# Patient Record
Sex: MALE | Race: WHITE | Employment: FULL TIME | URBAN - METROPOLITAN AREA
[De-identification: names, ages, dates, MRNs, and addresses within clinical notes are randomized per-mention and may not be internally consistent; named-entity substitution may affect disease eponyms.]

---

## 2018-04-23 ENCOUNTER — HOSPITAL ENCOUNTER (EMERGENCY)
Age: 28
Discharge: HOME | End: 2018-04-23
Attending: EMERGENCY MEDICINE
Payer: COMMERCIAL

## 2018-04-23 ENCOUNTER — APPOINTMENT (OUTPATIENT)
Dept: GENERAL RADIOLOGY | Age: 28
End: 2018-04-23
Payer: COMMERCIAL

## 2018-04-23 VITALS
HEIGHT: 69 IN | HEART RATE: 77 BPM | DIASTOLIC BLOOD PRESSURE: 60 MMHG | SYSTOLIC BLOOD PRESSURE: 128 MMHG | WEIGHT: 175 LBS | BODY MASS INDEX: 25.92 KG/M2 | RESPIRATION RATE: 20 BRPM | OXYGEN SATURATION: 98 % | TEMPERATURE: 98.2 F

## 2018-04-23 DIAGNOSIS — S60.111A CONTUSION OF RIGHT THUMB WITH DAMAGE TO NAIL, INITIAL ENCOUNTER: Primary | ICD-10-CM

## 2018-04-23 DIAGNOSIS — S62.639A CLOSED AVULSION FRACTURE OF DISTAL PHALANX OF FINGER, INITIAL ENCOUNTER: ICD-10-CM

## 2018-04-23 PROCEDURE — 6370000000 HC RX 637 (ALT 250 FOR IP): Performed by: EMERGENCY MEDICINE

## 2018-04-23 PROCEDURE — 29130 APPL FINGER SPLINT STATIC: CPT

## 2018-04-23 PROCEDURE — 99283 EMERGENCY DEPT VISIT LOW MDM: CPT

## 2018-04-23 PROCEDURE — 73140 X-RAY EXAM OF FINGER(S): CPT

## 2018-04-23 RX ORDER — DEXTROAMPHETAMINE SACCHARATE, AMPHETAMINE ASPARTATE MONOHYDRATE, DEXTROAMPHETAMINE SULFATE AND AMPHETAMINE SULFATE 1.25; 1.25; 1.25; 1.25 MG/1; MG/1; MG/1; MG/1
5 CAPSULE, EXTENDED RELEASE ORAL EVERY MORNING
COMMUNITY

## 2018-04-23 RX ORDER — GINSENG 100 MG
CAPSULE ORAL 3 TIMES DAILY
Status: DISCONTINUED | OUTPATIENT
Start: 2018-04-23 | End: 2018-04-23 | Stop reason: HOSPADM

## 2018-04-23 RX ORDER — IBUPROFEN 600 MG/1
600 TABLET ORAL EVERY 6 HOURS PRN
Qty: 30 TABLET | Refills: 0 | Status: SHIPPED | OUTPATIENT
Start: 2018-04-23

## 2018-04-23 RX ORDER — CEPHALEXIN 500 MG/1
500 CAPSULE ORAL 4 TIMES DAILY
Qty: 28 CAPSULE | Refills: 0 | Status: SHIPPED | OUTPATIENT
Start: 2018-04-23 | End: 2018-04-30

## 2018-04-23 RX ADMIN — BACITRACIN ZINC: 500 OINTMENT TOPICAL at 10:59

## 2018-04-23 ASSESSMENT — ENCOUNTER SYMPTOMS
SINUS PRESSURE: 0
EYE DISCHARGE: 0
CHEST TIGHTNESS: 0
DIARRHEA: 0
SHORTNESS OF BREATH: 0
EYE REDNESS: 0
FACIAL SWELLING: 0
SORE THROAT: 0
BLOOD IN STOOL: 0
TROUBLE SWALLOWING: 0
COUGH: 0
VOICE CHANGE: 0
BACK PAIN: 0
STRIDOR: 0
VOMITING: 0
WHEEZING: 0
CHOKING: 0
CONSTIPATION: 0
EYE PAIN: 0
ABDOMINAL PAIN: 0

## 2018-04-23 ASSESSMENT — PAIN DESCRIPTION - LOCATION: LOCATION: FINGER (COMMENT WHICH ONE)

## 2018-04-23 ASSESSMENT — PAIN DESCRIPTION - ORIENTATION: ORIENTATION: RIGHT

## 2018-04-23 ASSESSMENT — PAIN DESCRIPTION - PAIN TYPE: TYPE: ACUTE PAIN

## 2018-04-23 ASSESSMENT — PAIN DESCRIPTION - DESCRIPTORS: DESCRIPTORS: ACHING

## 2018-04-23 ASSESSMENT — PAIN SCALES - GENERAL: PAINLEVEL_OUTOF10: 3

## 2024-12-18 ENCOUNTER — APPOINTMENT (OUTPATIENT)
Dept: PRIMARY CARE | Facility: CLINIC | Age: 34
End: 2024-12-18
Payer: COMMERCIAL

## 2024-12-18 VITALS
DIASTOLIC BLOOD PRESSURE: 84 MMHG | OXYGEN SATURATION: 98 % | TEMPERATURE: 98.1 F | SYSTOLIC BLOOD PRESSURE: 128 MMHG | HEART RATE: 79 BPM | BODY MASS INDEX: 28.5 KG/M2 | WEIGHT: 193 LBS

## 2024-12-18 DIAGNOSIS — Z00.00 ANNUAL WELLNESS VISIT: ICD-10-CM

## 2024-12-18 DIAGNOSIS — Z76.89 ENCOUNTER TO ESTABLISH CARE: ICD-10-CM

## 2024-12-18 DIAGNOSIS — K58.9 IRRITABLE BOWEL SYNDROME, UNSPECIFIED TYPE: ICD-10-CM

## 2024-12-18 DIAGNOSIS — J30.9 ALLERGIC RHINITIS, UNSPECIFIED SEASONALITY, UNSPECIFIED TRIGGER: Primary | ICD-10-CM

## 2024-12-18 DIAGNOSIS — B00.1 RECURRENT COLD SORES: ICD-10-CM

## 2024-12-18 PROCEDURE — 99385 PREV VISIT NEW AGE 18-39: CPT | Performed by: FAMILY MEDICINE

## 2024-12-18 RX ORDER — VALACYCLOVIR HYDROCHLORIDE 1 G/1
TABLET, FILM COATED ORAL
Qty: 4 TABLET | Refills: 6 | Status: SHIPPED | OUTPATIENT
Start: 2024-12-18

## 2024-12-18 ASSESSMENT — COLUMBIA-SUICIDE SEVERITY RATING SCALE - C-SSRS
2. HAVE YOU ACTUALLY HAD ANY THOUGHTS OF KILLING YOURSELF?: NO
6. HAVE YOU EVER DONE ANYTHING, STARTED TO DO ANYTHING, OR PREPARED TO DO ANYTHING TO END YOUR LIFE?: NO
1. IN THE PAST MONTH, HAVE YOU WISHED YOU WERE DEAD OR WISHED YOU COULD GO TO SLEEP AND NOT WAKE UP?: NO

## 2024-12-18 ASSESSMENT — ENCOUNTER SYMPTOMS
MYALGIAS: 0
SORE THROAT: 0
WHEEZING: 0
DEPRESSION: 1
COUGH: 0
ARTHRALGIAS: 0
LIGHT-HEADEDNESS: 0
FEVER: 0
DIARRHEA: 0
DYSURIA: 0
HEMATURIA: 0
AGITATION: 0
HEADACHES: 0
FLANK PAIN: 0
NERVOUS/ANXIOUS: 0
UNEXPECTED WEIGHT CHANGE: 0
SHORTNESS OF BREATH: 0
JOINT SWELLING: 0
PALPITATIONS: 0
CONSTIPATION: 0
NAUSEA: 0
FATIGUE: 0
ABDOMINAL PAIN: 0

## 2024-12-18 ASSESSMENT — PATIENT HEALTH QUESTIONNAIRE - PHQ9
SUM OF ALL RESPONSES TO PHQ9 QUESTIONS 1 AND 2: 1
10. IF YOU CHECKED OFF ANY PROBLEMS, HOW DIFFICULT HAVE THESE PROBLEMS MADE IT FOR YOU TO DO YOUR WORK, TAKE CARE OF THINGS AT HOME, OR GET ALONG WITH OTHER PEOPLE: NOT DIFFICULT AT ALL
1. LITTLE INTEREST OR PLEASURE IN DOING THINGS: NOT AT ALL
2. FEELING DOWN, DEPRESSED OR HOPELESS: SEVERAL DAYS

## 2024-12-18 NOTE — PROGRESS NOTES
Subjective   Chief complaint: Jose Davis is a 34 y.o. male who presents for New Patient Visit.    HPI:  HPI  To establish.  History of recurrent cold sores.  Request pharmacotherapy.  Previously was on antiviral.  Antibiotic prescription provided discussed its usage.  History of seasonal allergic rhinitis.  Previous allergist visit was noted.  Suggested possibly initiating desensitization immunotherapy patient shows against.  Occasionally has seasonal symptoms currently absent.  Also has history of irritable bowel syndrome.  He has had several different colonoscopies in the past.  Currently asymptomatic.  Otherwise encouraged continued active healthy lifestyle avoidance of tobacco excessive alcohol or illegal substances.  He is employed.  Works in [employer].  In [employer].  Lives in [place].  He is .  His wife is a teacher.  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  General laboratory assessment.  Past Medical History:   Diagnosis Date   • Attention-deficit hyperactivity disorder, unspecified type     Adult ADHD   • Herpesviral vesicular dermatitis 03/21/2018    Recurrent cold sores   • Other seasonal allergic rhinitis 05/16/2019    Seasonal allergies   • Personal history of other diseases of the digestive system     History of ulcerative colitis     Past Surgical History:   Procedure Laterality Date   • COLONOSCOPY  02/19/2019    Colonoscopy   • OTHER SURGICAL HISTORY  04/10/2017    Oral Surgery Tooth Extraction [place] [facility]   • OTHER SURGICAL HISTORY  03/07/2019    Esophagogastroduodenoscopy     Social History     Socioeconomic History   • Marital status:      Spouse name: Not on file   • Number of children: Not on file   • Years of education: Not on file   • Highest education level: Not on file   Occupational History   • Not on file   Tobacco Use   • Smoking status: Former     Types: Cigarettes   • Smokeless tobacco: Current     Types: Chew   Substance and Sexual Activity   • Alcohol use: Yes   •  Drug use: Not Currently   • Sexual activity: Not on file   Other Topics Concern   • Not on file   Social History Narrative   • Not on file     Social Drivers of Health     Financial Resource Strain: Not on file   Food Insecurity: Not on file   Transportation Needs: Not on file   Physical Activity: Not on file   Stress: Not on file   Social Connections: Not on file   Intimate Partner Violence: Not on file   Housing Stability: Not on file     Family History   Problem Relation Name Age of Onset   • No Known Problems Mother     • No Known Problems Father     • Heart attack Maternal Grandfather         Objective   /84 (BP Location: Left arm, Patient Position: Sitting, BP Cuff Size: Large adult)   Pulse 79   Temp 36.7 °C (98.1 °F) (Tympanic)   Wt 87.5 kg (193 lb)   SpO2 98%   BMI 28.50 kg/m²   Physical Exam  Vitals and nursing note reviewed.   Constitutional:       General: He is not in acute distress.     Appearance: He is not ill-appearing or toxic-appearing.   HENT:      Head: Normocephalic and atraumatic.      Mouth/Throat:      Pharynx: No oropharyngeal exudate or posterior oropharyngeal erythema.   Eyes:      Extraocular Movements: Extraocular movements intact.      Conjunctiva/sclera: Conjunctivae normal.      Pupils: Pupils are equal, round, and reactive to light.   Cardiovascular:      Rate and Rhythm: Normal rate and regular rhythm.      Pulses: Normal pulses.      Heart sounds: Normal heart sounds. No murmur heard.  Pulmonary:      Effort: Pulmonary effort is normal.      Breath sounds: Normal breath sounds. No wheezing, rhonchi or rales.   Abdominal:      General: Abdomen is flat. Bowel sounds are normal. There is no distension.      Palpations: Abdomen is soft. There is no mass.      Tenderness: There is no abdominal tenderness.      Hernia: No hernia is present.   Musculoskeletal:         General: No swelling or deformity. Normal range of motion.      Cervical back: Normal range of motion and neck  supple.   Skin:     General: Skin is warm and dry.      Capillary Refill: Capillary refill takes less than 2 seconds.      Coloration: Skin is not jaundiced.      Findings: No erythema or rash.   Neurological:      General: No focal deficit present.      Mental Status: He is oriented to person, place, and time. Mental status is at baseline.   Psychiatric:         Mood and Affect: Mood normal.         Behavior: Behavior normal.         Thought Content: Thought content normal.         Judgment: Judgment normal.     Review of Systems   Constitutional:  Negative for fatigue, fever and unexpected weight change.   HENT:  Negative for congestion and sore throat.    Respiratory:  Negative for cough, shortness of breath and wheezing.    Cardiovascular:  Negative for chest pain, palpitations and leg swelling.   Gastrointestinal:  Negative for abdominal pain, constipation, diarrhea and nausea.   Genitourinary:  Negative for dysuria, flank pain and hematuria.   Musculoskeletal:  Negative for arthralgias, joint swelling and myalgias.   Skin:  Negative for rash.   Neurological:  Negative for syncope, light-headedness and headaches.   Psychiatric/Behavioral:  Negative for agitation. The patient is not nervous/anxious.       I have reviewed and reconciled the medication list with the patient today.   Current Outpatient Medications:   •  valACYclovir (Valtrex) 1 gram tablet, Take 2 tablets q12hr  for one day at the first sign of cold sore, Disp: 4 tablet, Rfl: 6     Imaging:  No results found.     Labs reviewed:    Lab Results   Component Value Date    WBC 4.8 04/18/2019    HGB 15.1 04/18/2019    HCT 47.1 04/18/2019     04/18/2019    ALT 21 04/18/2019    AST 17 04/18/2019     04/18/2019    K 4.4 04/18/2019     04/18/2019    CREATININE 0.87 04/18/2019    BUN 13 04/18/2019    CO2 30 04/18/2019    TSH 1.03 04/18/2019       Assessment/Plan   Problem List Items Addressed This Visit    None  Visit Diagnoses          Codes    Allergic rhinitis, unspecified seasonality, unspecified trigger    -  Primary J30.9    Recurrent cold sores     B00.1    Relevant Medications    valACYclovir (Valtrex) 1 gram tablet    Encounter to establish care     Z76.89    Annual wellness visit     Z00.00    Relevant Orders    Comprehensive metabolic panel    Lipid panel    CBC    Irritable bowel syndrome, unspecified type     K58.9            Reinforced lifestyle modifications  Continue current medications as listed  Physical activity as tolerated and healthy diet encouraged  Maintain a healthy weight  Follow up in

## 2025-08-06 ENCOUNTER — TELEPHONE (OUTPATIENT)
Dept: PRIMARY CARE | Facility: CLINIC | Age: 35
End: 2025-08-06
Payer: COMMERCIAL

## 2025-08-06 NOTE — TELEPHONE ENCOUNTER
Patient called office today after rescheduling his appointment requesting additional labs to be added to his current labs. Patient would like to add Testerone and sperm count to labs.

## 2025-08-08 ENCOUNTER — APPOINTMENT (OUTPATIENT)
Dept: PRIMARY CARE | Facility: CLINIC | Age: 35
End: 2025-08-08
Payer: COMMERCIAL

## 2025-09-19 ENCOUNTER — APPOINTMENT (OUTPATIENT)
Dept: PRIMARY CARE | Facility: CLINIC | Age: 35
End: 2025-09-19
Payer: COMMERCIAL